# Patient Record
Sex: MALE | Race: WHITE | NOT HISPANIC OR LATINO
[De-identification: names, ages, dates, MRNs, and addresses within clinical notes are randomized per-mention and may not be internally consistent; named-entity substitution may affect disease eponyms.]

---

## 2017-03-28 ENCOUNTER — CHARTING TRANS (OUTPATIENT)
Dept: OTHER | Age: 62
End: 2017-03-28

## 2017-03-28 ASSESSMENT — PAIN SCALES - GENERAL: PAINLEVEL_OUTOF10: 0

## 2017-03-30 ENCOUNTER — LAB SERVICES (OUTPATIENT)
Dept: OTHER | Age: 62
End: 2017-03-30

## 2017-03-31 LAB
APPEARANCE UR: CLEAR
BACTERIA #/AREA URNS HPF: NORMAL /HPF
BILIRUB UR QL: NEGATIVE
CHOLEST SERPL-MCNC: 214 MG/DL
CHOLEST/HDLC SERPL: 5.8
COLOR UR: YELLOW
ERYTHROCYTE [DISTWIDTH] IN BLOOD: 13.7 % (ref 11–15)
GLUCOSE UR-MCNC: NEGATIVE MG/DL
HDLC SERPL-MCNC: 37 MG/DL
HEMATOCRIT: 46.2 % (ref 39–51)
HEMOGLOBIN: 14.6 G/DL (ref 13–17)
HYALINE CASTS #/AREA URNS LPF: NORMAL /LPF (ref 0–5)
KETONES UR-MCNC: NEGATIVE MG/DL
LDLC SERPL CALC-MCNC: 108 MG/DL
LENGTH OF FAST TIME PATIENT: ABNORMAL HRS
MEAN CORPUSCULAR HEMOGLOBIN: 29.1 PG (ref 26–34)
MEAN CORPUSCULAR HGB CONC: 31.6 G/DL (ref 32–36.5)
MEAN CORPUSCULAR VOLUME: 92 FL (ref 78–100)
NITRITE UR QL: NEGATIVE
NONHDLC SERPL-MCNC: 177 MG/DL
PH UR: 6 UNITS (ref 5–7)
PLATELET COUNT: 217 K/MCL (ref 140–450)
PROT UR QL: NEGATIVE MG/DL
PSA SERPL-MCNC: 1.88 NG/ML
RBC #/AREA URNS HPF: NORMAL /HPF (ref 0–3)
RBC-URINE: NEGATIVE
RED CELL COUNT: 5.02 MIL/MCL (ref 4.5–5.9)
SP GR UR: 1.02 (ref 1–1.03)
SPECIMEN SOURCE: NORMAL
SQUAMOUS #/AREA URNS HPF: NORMAL /HPF (ref 0–5)
TRIGL SERPL-MCNC: 345 MG/DL
TSH SERPL-ACNC: 0.72 MCUNITS/ML (ref 0.35–5)
UROBILINOGEN UR QL: 0.2 MG/DL (ref 0–1)
WBC #/AREA URNS HPF: NORMAL /HPF (ref 0–5)
WBC-URINE: NEGATIVE
WHITE BLOOD COUNT: 6.1 K/MCL (ref 4.2–11)

## 2017-04-10 ENCOUNTER — CHARTING TRANS (OUTPATIENT)
Dept: OTHER | Age: 62
End: 2017-04-10

## 2017-04-12 ENCOUNTER — CHARTING TRANS (OUTPATIENT)
Dept: OTHER | Age: 62
End: 2017-04-12

## 2017-05-02 ENCOUNTER — CHARTING TRANS (OUTPATIENT)
Dept: OTHER | Age: 62
End: 2017-05-02

## 2018-11-04 VITALS
BODY MASS INDEX: 36.21 KG/M2 | WEIGHT: 244.49 LBS | DIASTOLIC BLOOD PRESSURE: 80 MMHG | HEIGHT: 69 IN | HEART RATE: 74 BPM | SYSTOLIC BLOOD PRESSURE: 122 MMHG | TEMPERATURE: 97.9 F | OXYGEN SATURATION: 97 %

## 2019-06-27 ENCOUNTER — APPOINTMENT (OUTPATIENT)
Dept: SLEEP MEDICINE | Age: 64
End: 2019-06-27

## 2021-04-24 ENCOUNTER — APPOINTMENT (OUTPATIENT)
Dept: GENERAL RADIOLOGY | Facility: HOSPITAL | Age: 66
DRG: 872 | End: 2021-04-24
Attending: EMERGENCY MEDICINE
Payer: COMMERCIAL

## 2021-04-24 ENCOUNTER — APPOINTMENT (OUTPATIENT)
Dept: CT IMAGING | Facility: HOSPITAL | Age: 66
DRG: 872 | End: 2021-04-24
Attending: EMERGENCY MEDICINE
Payer: COMMERCIAL

## 2021-04-24 ENCOUNTER — HOSPITAL ENCOUNTER (INPATIENT)
Facility: HOSPITAL | Age: 66
LOS: 3 days | Discharge: HOME | DRG: 872 | End: 2021-04-28
Attending: EMERGENCY MEDICINE | Admitting: HOSPITALIST
Payer: COMMERCIAL

## 2021-04-24 DIAGNOSIS — D69.6 THROMBOCYTOPENIA (HCC): ICD-10-CM

## 2021-04-24 DIAGNOSIS — A41.9 SEPSIS WITHOUT ACUTE ORGAN DYSFUNCTION, DUE TO UNSPECIFIED ORGANISM (HCC): Primary | ICD-10-CM

## 2021-04-24 DIAGNOSIS — N39.0 URINARY TRACT INFECTION WITHOUT HEMATURIA, SITE UNSPECIFIED: ICD-10-CM

## 2021-04-24 PROCEDURE — 71045 X-RAY EXAM CHEST 1 VIEW: CPT | Performed by: EMERGENCY MEDICINE

## 2021-04-24 PROCEDURE — 99223 1ST HOSP IP/OBS HIGH 75: CPT | Performed by: HOSPITALIST

## 2021-04-24 PROCEDURE — 71275 CT ANGIOGRAPHY CHEST: CPT | Performed by: EMERGENCY MEDICINE

## 2021-04-24 RX ORDER — TAMSULOSIN HYDROCHLORIDE 0.4 MG/1
0.4 CAPSULE ORAL DAILY
COMMUNITY

## 2021-04-24 RX ORDER — PANTOPRAZOLE SODIUM 40 MG/1
40 TABLET, DELAYED RELEASE ORAL
COMMUNITY

## 2021-04-24 RX ORDER — ACETAMINOPHEN 500 MG
1000 TABLET ORAL ONCE
Status: COMPLETED | OUTPATIENT
Start: 2021-04-24 | End: 2021-04-24

## 2021-04-25 PROBLEM — D69.6 THROMBOCYTOPENIA (HCC): Status: ACTIVE | Noted: 2021-04-25

## 2021-04-25 PROBLEM — N39.0 URINARY TRACT INFECTION WITHOUT HEMATURIA, SITE UNSPECIFIED: Status: ACTIVE | Noted: 2021-04-25

## 2021-04-25 PROBLEM — A41.9 SEPSIS WITHOUT ACUTE ORGAN DYSFUNCTION, DUE TO UNSPECIFIED ORGANISM (HCC): Status: ACTIVE | Noted: 2021-04-25

## 2021-04-25 PROCEDURE — 99232 SBSQ HOSP IP/OBS MODERATE 35: CPT | Performed by: HOSPITALIST

## 2021-04-25 PROCEDURE — 5A09357 ASSISTANCE WITH RESPIRATORY VENTILATION, LESS THAN 24 CONSECUTIVE HOURS, CONTINUOUS POSITIVE AIRWAY PRESSURE: ICD-10-PCS | Performed by: EMERGENCY MEDICINE

## 2021-04-25 RX ORDER — METOCLOPRAMIDE HYDROCHLORIDE 5 MG/ML
10 INJECTION INTRAMUSCULAR; INTRAVENOUS EVERY 8 HOURS PRN
Status: DISCONTINUED | OUTPATIENT
Start: 2021-04-25 | End: 2021-04-28

## 2021-04-25 RX ORDER — ENOXAPARIN SODIUM 100 MG/ML
40 INJECTION SUBCUTANEOUS DAILY
Status: DISCONTINUED | OUTPATIENT
Start: 2021-04-25 | End: 2021-04-28

## 2021-04-25 RX ORDER — IBUPROFEN 400 MG/1
400 TABLET ORAL EVERY 4 HOURS PRN
Status: DISCONTINUED | OUTPATIENT
Start: 2021-04-25 | End: 2021-04-28

## 2021-04-25 RX ORDER — ACETAMINOPHEN 325 MG/1
650 TABLET ORAL EVERY 6 HOURS PRN
Status: DISCONTINUED | OUTPATIENT
Start: 2021-04-25 | End: 2021-04-28

## 2021-04-25 RX ORDER — PANTOPRAZOLE SODIUM 40 MG/1
40 TABLET, DELAYED RELEASE ORAL
Status: DISCONTINUED | OUTPATIENT
Start: 2021-04-25 | End: 2021-04-28

## 2021-04-25 RX ORDER — ONDANSETRON 2 MG/ML
4 INJECTION INTRAMUSCULAR; INTRAVENOUS EVERY 6 HOURS PRN
Status: DISCONTINUED | OUTPATIENT
Start: 2021-04-25 | End: 2021-04-28

## 2021-04-25 RX ORDER — SODIUM CHLORIDE 9 MG/ML
INJECTION, SOLUTION INTRAVENOUS CONTINUOUS
Status: DISCONTINUED | OUTPATIENT
Start: 2021-04-25 | End: 2021-04-26

## 2021-04-25 NOTE — PROGRESS NOTES
[facility] Pharmacy Note: Antimicrobial Weight Based Dose Adjustment for: ceftriaxone (ROCEPHIN)    Luli Chau is a 76year old patient who has been prescribed ceftriaxone (ROCEPHIN) 1 gm every 24 hours.     Estimated Creatinine Clearance: 62.1 mL/min (based o

## 2021-04-25 NOTE — ED PROVIDER NOTES
Patient Seen in: [facility] Emergency Department      History   Patient presents with:  Fever  Difficulty Breathing    Stated Complaint: fever moe    HPI/Subjective:   HPI    Patient was having some frequency of urination last night but no burning o normal pulse oximetry  Eyes: sclera a little injected, conjunctiva pink and moist.  Lids and lashes are normal.  Nose: Unremarkable without purulent nasal secretions or overlying sinus erythema.   Throat: Posterior pharynx is normal.  Uvula midline nonswoll Narrative: The following orders were created for panel order CBC WITH DIFFERENTIAL WITH PLATELET.   Procedure                               Abnormality         Status                     ---------                               -----------         ------ tract infection    CTA chest  CONCLUSION:     1. No acute pulmonary embolism. 2. Fatty infiltration of the liver.        [facility]      Sepsis Reassessment Note    /58   Pulse 94   Temp 100.4 °F (38 °C) (Oral)   Resp 22   Ht 175.3 cm (5' 9\")

## 2021-04-25 NOTE — PROGRESS NOTES
Call from micro, blood cultures back positive. Gram negative ecoli by PCR. MD paged, awaiting call back. Order for repeat blood culture tonight 2100. Continue current abx.

## 2021-04-25 NOTE — PROGRESS NOTES
[facility]  Progress Note     Meli Patrick Patient Status:  Inpatient    1955 MRN [id number]   [facility] 4NW-A Attending Katheryn Earing [address] Day # 0 PCP PHYSICIAN NONSTAFF     Chief Complaint: Fever    S: [id number] 04/24/21 2042   TROP <0.045       Creatinine Kinase  No results for input(s): CK in the last 168 hours. Inflammatory Markers  Recent Labs   Lab 04/24/21 2042   DDIMER 1.13*       Imaging: Imaging data reviewed in Epic.     Medications:   • Pantoprazole

## 2021-04-25 NOTE — H&P
[facility]  History and Physical     Pako Oliveira Patient Status:  Emergency    1955 MRN [id number]   Location [facility] [facility] Attending Lulu Moreno MD   Hosp Day # 0 PCP PHYSICIAN NONSTAFF     Chief Complaint: Fe and oriented x 3. HEENT: Normocephalic atraumatic. Moist mucous membranes. Respiratory: Clear to auscultation bilaterally. Cardiovascular: S1, S2. Regular rate and rhythm. Chest and Back: No tenderness or deformity.   Abdomen: Soft, nontender, nondist Willie Maria MD  4/24/2021

## 2021-04-25 NOTE — PLAN OF CARE
NURSING ADMISSION NOTE      Patient admitted via cart  Oriented to room. Safety precautions initiated. Bed in low position. Call light in reach. Pt alert and oriented x4. VSS and afebrile on transfer. Pt febrile in ED and home.   Pt KAMILA, place

## 2021-04-25 NOTE — ED INITIAL ASSESSMENT (HPI)
Pt c/o fever started at [address], tonight Pt became short of breath and noted fever of 104.7. Pt took Motrin around 2000 this evening.  EMS rpt upon arrival Pt was stating 79% blow-by oxygen admin from home; they placed Pt on NRB 15Lit and Pt stating at 98%

## 2021-04-26 PROCEDURE — 99232 SBSQ HOSP IP/OBS MODERATE 35: CPT | Performed by: HOSPITALIST

## 2021-04-26 NOTE — PLAN OF CARE
Patient received this evening alert and oriented x 4, lungs clear on room air, CPAP at HS, abdomen soft, bowel sounds present, passing flatus, voiding adequate amounts, asymptomatic, patient complains of mild headache, IVF infusing, medications per MAR, mo

## 2021-04-26 NOTE — PROGRESS NOTES
[facility]  Progress Note     Dianne Lux Patient Status:  Inpatient    1955 MRN [id number]   [facility] 4NW-A Attending Virgil Foster [address] Day # 1 PCP PHYSICIAN NONSTAFF     Chief Complaint: Fever    S: [id number] 04/24/21 2042   TROP <0.045       Creatinine Kinase  No results for input(s): CK in the last 168 hours. Inflammatory Markers  Recent Labs   Lab 04/24/21 2042   DDIMER 1.13*       Imaging: Imaging data reviewed in Epic.     Medications:   • Pantoprazole

## 2021-04-26 NOTE — PLAN OF CARE
A&Ox4. VSS, afebrile. No complaints of pain this shift. RA during the day, KAMILA with CPAP at night. Continuous pulse ox with sats WNL. IVF discontinued this shift. Tolerating diet, no N/V/D. Positive blood cultures- E. Coli PCR.  IV antibiotics administe and behaviors that affect risk of falls.   - [place] fall precautions as indicated by assessment.  - Educate pt/family on patient safety including physical limitations  - Instruct pt to call for assistance with activity based on assessment  - Modify envir

## 2021-04-27 PROCEDURE — 99232 SBSQ HOSP IP/OBS MODERATE 35: CPT | Performed by: HOSPITALIST

## 2021-04-27 RX ORDER — LOSARTAN POTASSIUM 25 MG/1
18.75 TABLET ORAL DAILY
Status: DISCONTINUED | OUTPATIENT
Start: 2021-04-27 | End: 2021-04-28

## 2021-04-27 RX ORDER — TAMSULOSIN HYDROCHLORIDE 0.4 MG/1
0.4 CAPSULE ORAL DAILY
Status: DISCONTINUED | OUTPATIENT
Start: 2021-04-27 | End: 2021-04-28

## 2021-04-27 RX ORDER — CIPROFLOXACIN 500 MG/1
500 TABLET, FILM COATED ORAL EVERY 12 HOURS SCHEDULED
Status: DISCONTINUED | OUTPATIENT
Start: 2021-04-27 | End: 2021-04-28

## 2021-04-27 RX ORDER — LOSARTAN POTASSIUM 25 MG/1
20 TABLET ORAL DAILY
Status: DISCONTINUED | OUTPATIENT
Start: 2021-04-27 | End: 2021-04-27

## 2021-04-27 NOTE — PLAN OF CARE
Problem: RISK FOR INFECTION - ADULT  Goal: Absence of fever/infection during anticipated neutropenic period  Description: INTERVENTIONS  - Monitor WBC  - Administer growth factors as ordered  - Implement neutropenic guidelines  Outcome: Progressing     P gravity, serum osmolarity and serum sodium as indicated or ordered  - Monitor response to interventions for patient's volume status, including labs, urine output, blood pressure (other measures as available)  - Encourage oral intake as appropriate  - Instr

## 2021-04-27 NOTE — PLAN OF CARE
Pt A/O x4. VSS. Remained afebrile. No c/o pain throughout shift. CPAP used at HS. IV antibiotics given per MAR. Independent. Fall precautions in place. Bed in lowest position with call light within reach.          Problem: Patient/Family Goals  Goal: Patien limitations  - Instruct pt to call for assistance with activity based on assessment  - Modify environment to reduce risk of injury  - Provide assistive devices as appropriate  - Consider OT/PT consult to assist with strengthening/mobility  - Encourage toil

## 2021-04-27 NOTE — PROGRESS NOTES
[facility]  Progress Note     Mónica Butler Patient Status:  Inpatient    1955 MRN [id number]   [facility] 4NW-A Attending Aleisha Banegas [address] Day # 2 PCP PHYSICIAN NONSTAFF     Chief Complaint: Fever    S: IBUZIN 04/24/21 2042   TROP <0.045       Creatinine Kinase  No results for input(s): CK in the last 168 hours. Inflammatory Markers  Recent Labs   Lab 04/24/21 2042   DDIMER 1.13*       Imaging: Imaging data reviewed in Epic.     Medications:   • Ciprofloxaci

## 2021-04-27 NOTE — CM/SW NOTE
Care Progression Note:  Active Acute Medical Issue:   Sepsis without acute organ dysfunction, due to unspecified organism [facility])     Other Contributing Medical Factors/Dx. :Essential HTN, BPH.     Length of stay: 2  GMLOS:3.5  Avoidable Delays:   Discharge Ba

## 2021-04-28 VITALS
HEIGHT: 69 IN | SYSTOLIC BLOOD PRESSURE: 130 MMHG | BODY MASS INDEX: 37.2 KG/M2 | HEART RATE: 52 BPM | OXYGEN SATURATION: 94 % | WEIGHT: 251.13 LBS | RESPIRATION RATE: 18 BRPM | DIASTOLIC BLOOD PRESSURE: 69 MMHG | TEMPERATURE: 98 F

## 2021-04-28 PROCEDURE — 99239 HOSP IP/OBS DSCHRG MGMT >30: CPT | Performed by: HOSPITALIST

## 2021-04-28 RX ORDER — CIPROFLOXACIN 500 MG/1
500 TABLET, FILM COATED ORAL EVERY 12 HOURS SCHEDULED
Qty: 20 TABLET | Refills: 0 | Status: SHIPPED | OUTPATIENT
Start: 2021-04-28 | End: 2021-05-08

## 2021-04-28 NOTE — PROGRESS NOTES
[facility]  Progress Note     Bernadetteneto Abbott Patient Status:  Inpatient    1955 MRN [id number]   [facility] 4NW-A Attending Shraddha Eldridge [facility] Day # 3 PCP PHYSICIAN NONSTAFF     Chief Complaint: Fever    S:  No ov Kinase  No results for input(s): CK in the last 168 hours. Inflammatory Markers  Recent Labs   Lab 04/24/21 2042   DDIMER 1.13*       Imaging: Imaging data reviewed in Epic.     Medications:   • Ciprofloxacin HCl  500 mg Oral 2 times per day   • tamsulo

## 2021-04-28 NOTE — PLAN OF CARE
NURSING DISCHARGE NOTE    Discharged Home via Ambulatory. Accompanied by RN  Belongings Taken by patient/family. Discharge instructions given to pt at bedside, verbalized understanding re: medication changes, follow-up appointments.  Prescriptions s

## 2021-04-28 NOTE — PLAN OF CARE
A&Ox4, RA- CPAP @ night, tele- NSR. R hand and LAC IV SL. Denies SOB and nausea. CO of HA, tx w/ prn tylenol per [facility] w/ relief. Call light w/in reach, bed in lowest position, VSS, will continue to monitor.      Problem: PAIN - ADULT  Goal: Verbalizes/displa INTERVENTIONS:  - Assess for changes in respiratory status  - Assess for changes in mentation and behavior  - Position to facilitate oxygenation and minimize respiratory effort  - Oxygen supplementation based on oxygen saturation or ABGs  - Provide Smoking

## 2021-04-29 NOTE — DISCHARGE SUMMARY
[facility]  DISCHARGE SUMMARY     [facility] Patient Status:  Inpatient    1955 MRN [id number]   [facility] 4NW-A Attending No att. providers found   Hosp Day # 3 PCP PHYSICIAN NONSTAFF     Date of Admission: 2021  Demetrius Patient was clinically stable for discharge and converted to oral antibiotics. Patient discharged in good condition with close follow-up as outpatient with primary care physician.     Procedures during hospitalization:   • None    Incidental or significant